# Patient Record
Sex: FEMALE | Race: WHITE | NOT HISPANIC OR LATINO | Employment: PART TIME | URBAN - METROPOLITAN AREA
[De-identification: names, ages, dates, MRNs, and addresses within clinical notes are randomized per-mention and may not be internally consistent; named-entity substitution may affect disease eponyms.]

---
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
## 2023-05-24 ENCOUNTER — TELEPHONE (OUTPATIENT)
Dept: OBSTETRICS AND GYNECOLOGY | Facility: CLINIC | Age: 18
End: 2023-05-24

## 2024-02-28 ENCOUNTER — TELEPHONE (OUTPATIENT)
Dept: INTERNAL MEDICINE | Facility: CLINIC | Age: 19
End: 2024-02-28
Payer: COMMERCIAL

## 2024-02-28 NOTE — TELEPHONE ENCOUNTER
----- Message from Josseline Joyce MA sent at 2/28/2024 11:01 AM CST -----  Contact: eric@ [phone]  Pt called                In regards to needing staff to please give a call back.

## 2024-02-29 ENCOUNTER — OFFICE VISIT (OUTPATIENT)
Dept: INTERNAL MEDICINE | Facility: CLINIC | Age: 19
End: 2024-02-29
Payer: COMMERCIAL

## 2024-02-29 VITALS
RESPIRATION RATE: 18 BRPM | HEART RATE: 93 BPM | OXYGEN SATURATION: 97 % | BODY MASS INDEX: 26.37 KG/M2 | DIASTOLIC BLOOD PRESSURE: 70 MMHG | HEIGHT: 62 IN | SYSTOLIC BLOOD PRESSURE: 108 MMHG | TEMPERATURE: 98 F | WEIGHT: 143.31 LBS

## 2024-02-29 DIAGNOSIS — M54.50 CHRONIC BILATERAL LOW BACK PAIN WITHOUT SCIATICA: ICD-10-CM

## 2024-02-29 DIAGNOSIS — Z30.431 IUD CHECK UP: ICD-10-CM

## 2024-02-29 DIAGNOSIS — F31.9 BIPOLAR 1 DISORDER: Primary | ICD-10-CM

## 2024-02-29 DIAGNOSIS — F32.9 MAJOR DEPRESSIVE DISORDER WITH CURRENT ACTIVE EPISODE, UNSPECIFIED DEPRESSION EPISODE SEVERITY, UNSPECIFIED WHETHER RECURRENT: ICD-10-CM

## 2024-02-29 DIAGNOSIS — G89.29 CHRONIC BILATERAL LOW BACK PAIN WITHOUT SCIATICA: ICD-10-CM

## 2024-02-29 DIAGNOSIS — F41.1 GAD (GENERALIZED ANXIETY DISORDER): ICD-10-CM

## 2024-02-29 DIAGNOSIS — G43.909 MIGRAINE WITHOUT STATUS MIGRAINOSUS, NOT INTRACTABLE, UNSPECIFIED MIGRAINE TYPE: ICD-10-CM

## 2024-02-29 PROBLEM — F11.20 OPIOID DEPENDENCE: Status: ACTIVE | Noted: 2022-05-04

## 2024-02-29 PROBLEM — F41.9 ANXIETY: Status: ACTIVE | Noted: 2022-05-04

## 2024-02-29 PROCEDURE — 3008F BODY MASS INDEX DOCD: CPT | Mod: CPTII,S$GLB,, | Performed by: FAMILY MEDICINE

## 2024-02-29 PROCEDURE — 3078F DIAST BP <80 MM HG: CPT | Mod: CPTII,S$GLB,, | Performed by: FAMILY MEDICINE

## 2024-02-29 PROCEDURE — 99214 OFFICE O/P EST MOD 30 MIN: CPT | Mod: S$GLB,,, | Performed by: FAMILY MEDICINE

## 2024-02-29 PROCEDURE — 99999 PR PBB SHADOW E&M-EST. PATIENT-LVL V: CPT | Mod: PBBFAC,,, | Performed by: FAMILY MEDICINE

## 2024-02-29 PROCEDURE — 1160F RVW MEDS BY RX/DR IN RCRD: CPT | Mod: CPTII,S$GLB,, | Performed by: FAMILY MEDICINE

## 2024-02-29 PROCEDURE — 3074F SYST BP LT 130 MM HG: CPT | Mod: CPTII,S$GLB,, | Performed by: FAMILY MEDICINE

## 2024-02-29 PROCEDURE — 1159F MED LIST DOCD IN RCRD: CPT | Mod: CPTII,S$GLB,, | Performed by: FAMILY MEDICINE

## 2024-02-29 RX ORDER — DOXEPIN HYDROCHLORIDE 50 MG/1
50 CAPSULE ORAL NIGHTLY PRN
COMMUNITY
Start: 2024-02-25

## 2024-02-29 RX ORDER — VENLAFAXINE HYDROCHLORIDE 37.5 MG/1
37.5 CAPSULE, EXTENDED RELEASE ORAL EVERY MORNING
COMMUNITY
Start: 2024-02-25

## 2024-02-29 RX ORDER — ARIPIPRAZOLE 2 MG/1
2 TABLET ORAL EVERY MORNING
COMMUNITY
Start: 2024-02-25

## 2024-02-29 RX ORDER — SUMATRIPTAN SUCCINATE 25 MG/1
TABLET ORAL
Qty: 9 TABLET | Refills: 2 | Status: SHIPPED | OUTPATIENT
Start: 2024-02-29

## 2024-02-29 NOTE — PROGRESS NOTES
Subjective:       Patient ID: Piedad Tompkins is a 18 y.o. female.    Chief Complaint: Establish Care    History of Present Illness    Patient presents to clinic today to establish care.    - Need for a referral for psychiatric care following recent discharge from outpatient treatment at [facility].  - Patient reports lower back and hip pain for approximately two years, which she attributes to her high school ball playing. Over-the-counter medications have not provided relief. She also reports an increase in the frequency of her migraines over the past year, occurring at least twice weekly and previously used Toradol for them. The patient is concerned about her IUD nearing its expiration date, recalling the insertion process as being extremely painful, and is in need of a referral to an OB/GYN for this issue as well as a referral to a pain management specialist for her back pain.    MEDICATIONS:  - Aleve as needed for migraines  - Abilify, Doxepin and Venlafaxine per Psychiatry at [facility], currently has Rx    MEDICAL HISTORY:  - The patient has not received a flu vaccine this season and declines to get one.  - History of psychiatric issues  - History of migraines, first diagnosed at age 8 and confirmed six months ago in [place]  - History of opioid use disorder  - Has not consulted a pain management specialist for back pain  - Recently diagnosed with bipolar I, major depressive disorder (MDD), and generalized anxiety disorder    Review of Systems   Constitutional:  Negative for chills, fatigue, fever and unexpected weight change.   HENT:  Negative for congestion, dental problem, ear pain, hearing loss, rhinorrhea and trouble swallowing.    Eyes:  Positive for pain (chronic). Negative for visual disturbance.   Respiratory:  Negative for cough and shortness of breath.    Cardiovascular:  Negative for chest pain, palpitations and leg swelling.   Gastrointestinal:  Negative for abdominal distention, abdominal pain,  blood in stool, constipation, diarrhea, nausea and vomiting.   Genitourinary:  Negative for difficulty urinating and vaginal discharge.   Musculoskeletal:  Positive for arthralgias (chronic), back pain (chronic) and myalgias (chronic).   Skin:  Negative for rash.   Neurological:  Positive for headaches (migraines, chronic, more frequent latetly). Negative for dizziness, weakness and numbness.   Hematological:  Negative for adenopathy. Does not bruise/bleed easily.   Psychiatric/Behavioral:  Positive for sleep disturbance (chronic). Negative for dysphoric mood. The patient is nervous/anxious (chronic).        Objective:     Physical Exam  Vitals reviewed.   Constitutional:       General: She is not in acute distress.     Appearance: She is well-developed.   HENT:      Head: Normocephalic and atraumatic.   Eyes:      General: Lids are normal. No scleral icterus.     Extraocular Movements: Extraocular movements intact.      Conjunctiva/sclera: Conjunctivae normal.      Pupils: Pupils are equal, round, and reactive to light.   Pulmonary:      Effort: Pulmonary effort is normal.   Neurological:      Mental Status: She is alert and oriented to person, place, and time.      Cranial Nerves: No cranial nerve deficit.      Gait: Gait normal.   Psychiatric:         Mood and Affect: Mood and affect normal.         Assessment:       1. Bipolar 1 disorder    2. MAHI (generalized anxiety disorder)    3. Major depressive disorder with current active episode, unspecified depression episode severity, unspecified whether recurrent    4. Migraine without status migrainosus, not intractable, unspecified migraine type    5. Chronic bilateral low back pain without sciatica    6. IUD check up        Plan:   1. Bipolar 1 disorder  -     Ambulatory referral/consult to Psychiatry    2. MAHI (generalized anxiety disorder)  -     Ambulatory referral/consult to Psychiatry    3. Major depressive disorder with current active episode, unspecified  depression episode severity, unspecified whether recurrent  -     Ambulatory referral/consult to Psychiatry    4. Migraine without status migrainosus, not intractable, unspecified migraine type  -     sumatriptan (IMITREX) 25 MG Tab; 1 tablet po at onset of headache, may repeat dose in 2 hours if headache is still present.  Dispense: 9 tablet; Refill: 2    5. Chronic bilateral low back pain without sciatica  -     Ambulatory referral/consult to Pain Clinic; Future; Expected date: 03/07/2024    6. IUD check up  -     Ambulatory referral/consult to Obstetrics / Gynecology; Future; Expected date: 03/07/2024       Addressed the patient's immediate concerns, which encompassed mental health issues, IUD replacement, back pain, and migraines. Referred the patient to a psychiatrist, an OB/GYN, and a pain management team, taking into account the patient's recent hospitalization and psychiatric history. Advised to return for a checkup in 6 months and to communicate if they require medications prior to their psychiatry appointment.   Prescribed Imitrex for the patient's migraines. Explained the potential triggers for migraines, emphasizing the role of stress and anxiety in their frequency.   Advised can provide refills of psychiatric medications until her psychiatry appointment, but will not be able to fill medications going forward.    Patient expressed understanding and agreement with plan.    Health Maintenance reviewed/updated.    Follow up in about 6 months (around 8/29/2024), or if symptoms worsen or fail to improve, for EPP/annual with [facility].    This note was generated with the assistance of ambient listening technology. Verbal consent was obtained by the patient and accompanying visitor(s) for the recording of patient appointment to facilitate this note. I attest to having reviewed and edited the generated note for accuracy, though some syntax or spelling errors may persist. Please contact the author of this note for  any clarification.

## 2024-03-12 ENCOUNTER — TELEPHONE (OUTPATIENT)
Dept: OBSTETRICS AND GYNECOLOGY | Facility: CLINIC | Age: 19
End: 2024-03-12
Payer: COMMERCIAL

## 2024-03-12 NOTE — TELEPHONE ENCOUNTER
Lvm for pt to return call to get appointment scheduled with our department.   Referral placed for IUD check

## 2024-03-15 ENCOUNTER — TELEPHONE (OUTPATIENT)
Dept: PAIN MEDICINE | Facility: CLINIC | Age: 19
End: 2024-03-15
Payer: COMMERCIAL

## 2024-03-18 ENCOUNTER — TELEPHONE (OUTPATIENT)
Dept: PAIN MEDICINE | Facility: CLINIC | Age: 19
End: 2024-03-18
Payer: COMMERCIAL

## 2024-07-05 ENCOUNTER — TELEPHONE (OUTPATIENT)
Dept: INTERNAL MEDICINE | Facility: CLINIC | Age: 19
End: 2024-07-05
Payer: COMMERCIAL

## 2024-07-08 ENCOUNTER — PATIENT MESSAGE (OUTPATIENT)
Dept: PSYCHIATRY | Facility: CLINIC | Age: 19
End: 2024-07-08
Payer: COMMERCIAL

## 2024-07-08 ENCOUNTER — TELEPHONE (OUTPATIENT)
Dept: PSYCHIATRY | Facility: CLINIC | Age: 19
End: 2024-07-08
Payer: COMMERCIAL

## 2024-08-01 ENCOUNTER — TELEPHONE (OUTPATIENT)
Dept: INTERNAL MEDICINE | Facility: CLINIC | Age: 19
End: 2024-08-01
Payer: COMMERCIAL

## 2024-08-31 ENCOUNTER — ON-DEMAND VIRTUAL (OUTPATIENT)
Dept: URGENT CARE | Facility: CLINIC | Age: 19
End: 2024-08-31
Payer: COMMERCIAL

## 2024-08-31 DIAGNOSIS — B35.4 TINEA CORPORIS: ICD-10-CM

## 2024-08-31 DIAGNOSIS — R35.0 URINARY FREQUENCY: Primary | ICD-10-CM

## 2024-08-31 PROCEDURE — 99213 OFFICE O/P EST LOW 20 MIN: CPT | Mod: CC,95,, | Performed by: FAMILY MEDICINE

## 2024-08-31 RX ORDER — NITROFURANTOIN 25; 75 MG/1; MG/1
100 CAPSULE ORAL 2 TIMES DAILY
Qty: 10 CAPSULE | Refills: 0 | Status: SHIPPED | OUTPATIENT
Start: 2024-08-31 | End: 2024-09-05

## 2024-08-31 RX ORDER — CLOTRIMAZOLE 1 %
CREAM (GRAM) TOPICAL 2 TIMES DAILY
Qty: 85 G | Refills: 0 | Status: SHIPPED | OUTPATIENT
Start: 2024-08-31

## 2024-08-31 NOTE — PROGRESS NOTES
Subjective:      Patient ID: Piedad Tompkins is a 19 y.o. female.    Vitals:  vitals were not taken for this visit.     Chief Complaint: Dysuria (1 month , )      Visit Type: TELE AUDIOVISUAL home [place]    Present with the patient at the time of consultation: TELEMED PRESENT WITH PATIENT: None    Past Medical History:   Diagnosis Date    Anxiety     Depression      Past Surgical History:   Procedure Laterality Date    TONSILLECTOMY, ADENOIDECTOMY       Review of patient's allergies indicates:  No Known Allergies  Current Outpatient Medications on File Prior to Visit   Medication Sig Dispense Refill    ARIPiprazole (ABILIFY) 2 MG Tab Take 2 mg by mouth every morning.      doxepin (SINEQUAN) 50 MG capsule Take 50 mg by mouth nightly as needed (insomnia).      sumatriptan (IMITREX) 25 MG Tab 1 tablet po at onset of headache, may repeat dose in 2 hours if headache is still present. 9 tablet 2    valACYclovir (VALTREX) 500 MG tablet Take 2 tablets (1,000 mg total) by mouth once daily. (Patient not taking: Reported on 2/29/2024) 60 tablet 11    venlafaxine (EFFEXOR-XR) 37.5 MG 24 hr capsule Take 37.5 mg by mouth every morning.       Current Facility-Administered Medications on File Prior to Visit   Medication Dose Route Frequency Provider Last Rate Last Admin    levonorgestrel 17.5 mcg/24 hrs (5 yrs) 19.5 mg IUD 17.5 mcg  17.5 mcg Intrauterine  Chiquis Gauthier MD   17.5 mcg at 07/23/19 1312     Family History   Problem Relation Name Age of Onset    No Known Problems Paternal Grandfather      No Known Problems Paternal Grandmother      No Known Problems Maternal Grandmother      No Known Problems Maternal Grandfather      No Known Problems Father      No Known Problems Mother      No Known Problems Brother      No Known Problems Sister      Breast cancer Neg Hx      Colon cancer Neg Hx      Ovarian cancer Neg Hx         Medications Ordered                [id number] [place] - [address], [place] [address]    Telephone: [phone]   Fax: [phone]   Hours: Not open 24 hours                         E-Prescribed (2 of 2)              clotrimazole (LOTRIMIN) 1 % cream    Sig: Apply topically 2 (two) times daily.       Start: 8/31/24     Quantity: 85 g Refills: 0                         nitrofurantoin, macrocrystal-monohydrate, (MACROBID) 100 MG capsule    Sig: Take 1 capsule (100 mg total) by mouth 2 (two) times daily. for 5 days       Start: 8/31/24     Quantity: 10 capsule Refills: 0                           Ohs Peq Odvv Intake    8/31/2024  2:09 PM CDT - Filed by Patient   What is your current physical address in the event of a medical emergency? [address] [place] [address]   Are you able to take your vital signs? No   Please attach any relevant images or files          ringworm and UTI   on IUD       Patient has mild  dysuria ,frequency feeling no urgency  on and off of 1 months, no UTI in past 3 months no history of a kidney stone or kidney infection, no vaginal discharge he, not a concern about STD, also she got a ringworm on her arm due to new kitten at home        Constitution: Negative for fever.   Gastrointestinal:  Negative for abdominal pain, nausea and vomiting.   Genitourinary:  Negative for flank pain, hematuria and vaginal discharge.        Objective:   The physical exam was conducted virtually.  Physical Exam   Constitutional: She is oriented to person, place, and time.   HENT:   Head: Normocephalic and atraumatic.   Eyes: Conjunctivae are normal.   Pulmonary/Chest: Effort normal. No respiratory distress.   Abdominal: Normal appearance.   Musculoskeletal:        Arms:       Comments: Ring shape rash on arms    Neurological: no focal deficit. She is alert and oriented to person, place, and time.   Skin: Skin is rash.   Psychiatric: Mood, judgment and thought content normal.       Assessment:     1. Urinary frequency    2. Tinea corporis        Plan:        Urinary frequency    Tinea corporis    Other orders  -     nitrofurantoin, macrocrystal-monohydrate, (MACROBID) 100 MG capsule; Take 1 capsule (100 mg total) by mouth 2 (two) times daily. for 5 days  Dispense: 10 capsule; Refill: 0  -     clotrimazole (LOTRIMIN) 1 % cream; Apply topically 2 (two) times daily.  Dispense: 85 g; Refill: 0        Suspected UTI by symptoms, Macrobid prescribed, drink a lot of fluid, if no improving in 3 to 5 days, please follow-up with provider in person.

## 2024-08-31 NOTE — PATIENT INSTRUCTIONS
Suspected UTI by symptoms, Macrobid prescribed, drink a lot of fluid, if no improving in 3 to 5 days, please follow-up with provider in person.

## 2024-09-16 ENCOUNTER — TELEPHONE (OUTPATIENT)
Dept: OBSTETRICS AND GYNECOLOGY | Facility: CLINIC | Age: 19
End: 2024-09-16
Payer: COMMERCIAL

## 2024-09-16 NOTE — TELEPHONE ENCOUNTER
----- Message from Valerie Nielsen sent at 2024 12:27 PM CDT -----  Contact: Piedad Morales is calling to receive a call back at [phone]. Reports needing to schedule as a np for iud removal. Reports its  and irritating her area.

## 2024-09-16 NOTE — TELEPHONE ENCOUNTER
Left message for patient to return call to [phone].    Patient needs appt. For IUD removal.  LOV 1/3/22

## 2024-10-11 ENCOUNTER — OFFICE VISIT (OUTPATIENT)
Dept: OBSTETRICS AND GYNECOLOGY | Facility: CLINIC | Age: 19
End: 2024-10-11
Payer: COMMERCIAL

## 2024-10-11 VITALS
HEIGHT: 62 IN | DIASTOLIC BLOOD PRESSURE: 60 MMHG | WEIGHT: 150.13 LBS | BODY MASS INDEX: 27.63 KG/M2 | SYSTOLIC BLOOD PRESSURE: 110 MMHG

## 2024-10-11 DIAGNOSIS — Z12.4 PAPANICOLAOU SMEAR FOR CERVICAL CANCER SCREENING: Primary | ICD-10-CM

## 2024-10-11 DIAGNOSIS — B36.9 FUNGAL INFECTION OF SKIN: ICD-10-CM

## 2024-10-11 DIAGNOSIS — Z11.3 SCREEN FOR STD (SEXUALLY TRANSMITTED DISEASE): ICD-10-CM

## 2024-10-11 DIAGNOSIS — Z30.432 ENCOUNTER FOR IUD REMOVAL: ICD-10-CM

## 2024-10-11 PROCEDURE — 87591 N.GONORRHOEAE DNA AMP PROB: CPT | Performed by: NURSE PRACTITIONER

## 2024-10-11 PROCEDURE — 99999 PR PBB SHADOW E&M-EST. PATIENT-LVL III: CPT | Mod: PBBFAC,,, | Performed by: NURSE PRACTITIONER

## 2024-10-11 PROCEDURE — 87491 CHLMYD TRACH DNA AMP PROBE: CPT | Performed by: NURSE PRACTITIONER

## 2024-10-11 RX ORDER — FLUCONAZOLE 150 MG/1
TABLET ORAL
Qty: 3 TABLET | Refills: 0 | Status: SHIPPED | OUTPATIENT
Start: 2024-10-11

## 2024-10-11 NOTE — PROCEDURES
Removal of IUD    Date/Time: 10/11/2024 1:30 PM    Performed by: Luisa Farrar NP  Authorized by: Luisa Farrar NP    Consent obtained:  Prior to procedure the appropriate consent was completed and verified  Consent given by:  Patient  Procedure risks and benefits discussed: yes    Patient questions answered: yes    Patient agrees, verbalizes understanding, and wants to proceed: yes    Educational handouts given: no    Instructions and paperwork completed: yes    Implant grasped by: ring forceps  Removal due to infection and inflammatory reaction: no    Other reason for removal:  Seeking pregnancy  Removal due to mechanical complications: no    Removed with no complications: yes  no

## 2024-10-11 NOTE — PROGRESS NOTES
"  Subjective:       Patient ID: Piedad Tompkins is a 19 y.o. female.    Chief Complaint:  Well Woman      History of Present Illness  HPI  Desires iud removal re:  seeking pregnancy  Reports ring worm to bilateral thigh, desires antifungal medication for the rash until she is able to see dermatologist   Health Maintenance   Topic Date Due    Lipid Panel  Never done    Pap Smear  Never done    TETANUS VACCINE  2024    Chlamydia Screening  2024    Hepatitis C Screening  Completed    HPV Vaccines  Completed     GYN & OB History  No LMP recorded. Patient has had an implant.   Date of Last Pap: today     OB History    Para Term  AB Living   0 0 0 0 0 0   SAB IAB Ectopic Multiple Live Births   0 0 0 0         Review of Systems  Review of Systems        Objective:   /60   Ht 5' 2" (1.575 m)   Wt 68.1 kg (150 lb 2.1 oz)   BMI 27.46 kg/m²    Physical Exam:   Constitutional: She is oriented to person, place, and time. She appears well-developed and well-nourished.             Abdominal: Soft.     Genitourinary:    Inguinal canal and vagina normal.      Pelvic exam was performed with patient supine.   The external female genitalia was normal.   Genitalia hair distrobution normal .     Labial bartholins normal.Cervix is normal. No erythema, vaginal discharge, bleeding, rectocele, cystocele or prolapse of vaginal walls in the vagina.    No foreign body in the vagina.      No signs of injury in the vagina.      pap smear completed              Neurological: She is alert and oriented to person, place, and time.    Skin: Skin is warm and dry.    Psychiatric: She has a normal mood and affect. Her behavior is normal. Judgment and thought content normal.      Assessment:        1. Papanicolaou smear for cervical cancer screening    2. Screen for STD (sexually transmitted disease)    3. Encounter for IUD removal    4. Fungal infection of skin                Plan:            Piedad was seen today for " well woman.    Diagnoses and all orders for this visit:    Papanicolaou smear for cervical cancer screening  -     Liquid-Based Pap Smear, Screening    Screen for STD (sexually transmitted disease)  -     C. trachomatis/N. gonorrhoeae by AMP DNA [facility]; Vagina  -     HIV 1/2 Ag/Ab (4th Gen); Future  -     Treponema Pallidium Antibodies IgG, IgM; Future  -     Hepatitis Panel, Acute; Future    Encounter for IUD removal  -     Removal of IUD    Fungal infection of skin  -     fluconazole (DIFLUCAN) 150 MG Tab; Take one tablet today and may repeat every three days up to 3 doses    Return to clinic in one year for WWE

## 2024-10-12 LAB
C TRACH DNA SPEC QL NAA+PROBE: NOT DETECTED
N GONORRHOEA DNA SPEC QL NAA+PROBE: NOT DETECTED

## 2025-01-04 ENCOUNTER — ON-DEMAND VIRTUAL (OUTPATIENT)
Dept: URGENT CARE | Facility: CLINIC | Age: 20
End: 2025-01-04
Payer: COMMERCIAL

## 2025-01-04 DIAGNOSIS — N39.0 URINARY TRACT INFECTION WITHOUT HEMATURIA, SITE UNSPECIFIED: Primary | ICD-10-CM

## 2025-01-04 RX ORDER — NITROFURANTOIN 25; 75 MG/1; MG/1
100 CAPSULE ORAL 2 TIMES DAILY
Qty: 14 CAPSULE | Refills: 0 | Status: SHIPPED | OUTPATIENT
Start: 2025-01-04 | End: 2025-01-11

## 2025-01-04 NOTE — LETTER
January 4, 2025    Piedad Tompkins  [address] [place] [address]             Virtual Visit - Urgent Care  Urgent Care  [address] [address]   January 4, 2025     Patient: Piedad Tompkins   YOB: 2005   Date of Visit: 1/4/2025       To Whom it May Concern:    Piedad Tompkins was seen virtually on 1/4/2025. She may return to work on 1/4/25 . Please excuse from work 1/3/25 due to illness.    Please excuse her from any classes or work missed.    If you have any questions or concerns, please don't hesitate to call.    Sincerely,         Denisha Gomez, ANGYP

## 2025-01-04 NOTE — PROGRESS NOTES
Subjective:      Patient ID: Piedad Tompkins is a 19 y.o. female.    Vitals:  vitals were not taken for this visit.     Chief Complaint: Urinary Tract Infection and Dysuria      Visit Type: TELE AUDIOVISUAL    Present with the patient at the time of consultation: TELEMED PRESENT WITH PATIENT: None    Past Medical History:   Diagnosis Date    Anxiety     Depression      Past Surgical History:   Procedure Laterality Date    TONSILLECTOMY, ADENOIDECTOMY       Review of patient's allergies indicates:  No Known Allergies  Current Outpatient Medications on File Prior to Visit   Medication Sig Dispense Refill    clotrimazole (LOTRIMIN) 1 % cream Apply topically 2 (two) times daily. 85 g 0    fluconazole (DIFLUCAN) 150 MG Tab Take one tablet today and may repeat every three days up to 3 doses 3 tablet 0    valACYclovir (VALTREX) 500 MG tablet Take 2 tablets (1,000 mg total) by mouth once daily. 60 tablet 11     Current Facility-Administered Medications on File Prior to Visit   Medication Dose Route Frequency Provider Last Rate Last Admin    levonorgestrel 17.5 mcg/24 hrs (5 yrs) 19.5 mg IUD 17.5 mcg  17.5 mcg Intrauterine  Chiquis Gauthier MD   17.5 mcg at 07/23/19 1312     Family History   Problem Relation Name Age of Onset    No Known Problems Paternal Grandfather      No Known Problems Paternal Grandmother      No Known Problems Maternal Grandmother      No Known Problems Maternal Grandfather      No Known Problems Father      No Known Problems Mother      No Known Problems Brother      No Known Problems Sister      Breast cancer Neg Hx      Colon cancer Neg Hx      Ovarian cancer Neg Hx         Medications Ordered                [id number] [place] - [address] [facility]   [address] [place] [place] [address]    Telephone: [phone]   Fax: [phone]   Hours: Not open 24 hours                         E-Prescribed (1 of 1)              nitrofurantoin,  macrocrystal-monohydrate, (MACROBID) 100 MG capsule    Sig: Take 1 capsule (100 mg total) by mouth 2 (two) times daily. for 7 days       Start: 1/4/25     Quantity: 14 capsule Refills: 0                           Ohs Peq Odvv Intake    1/4/2025  1:01 PM CST - Filed by Patient   What is your current physical address in the event of a medical emergency? [address]   Are you able to take your vital signs? No   Please attach any relevant images or files    Is your employer contracted with [facility]? No         HPI     Dysuria     Additional comments: Urinary frequency and burning with urination since yesterday.  Had fever yesterday but none today.  Two patient identifiers were used-name was repeated verbally as well as date of birth.  The patient is located at her workplace in [place]          Constitution: Positive for fever (now resolved).   Genitourinary:  Positive for dysuria, frequency and urgency. Negative for hematuria.        Objective:   The physical exam was conducted virtually.  Physical Exam   Constitutional: She is oriented to person, place, and time. No distress.   HENT:   Head: Normocephalic and atraumatic.   Neck: Neck supple.   Pulmonary/Chest: Effort normal. No respiratory distress.   Abdominal: Normal appearance.   Musculoskeletal: Normal range of motion.         General: Normal range of motion.   Neurological: no focal deficit. She is alert and oriented to person, place, and time.   Skin: Skin is not pale.   Psychiatric: Her behavior is normal. Mood, judgment and thought content normal.       Assessment:     1. Urinary tract infection without hematuria, site unspecified        Plan:       Urinary tract infection without hematuria, site unspecified    Other orders  -     nitrofurantoin, macrocrystal-monohydrate, (MACROBID) 100 MG capsule; Take 1 capsule (100 mg total) by mouth 2 (two) times daily. for 7 days  Dispense: 14 capsule; Refill: 0    Increase intake of fluids;  avoid caffeine.  Meds as directed.  F/u with PCP or to ER if symptoms become worse.    You must understand that you've received a virtual Care treatment only and that you may be released before all your medical problems are known or treated. You, the patient, will arrange for follow up care as instructed.  If your condition worsens we recommend that you receive another evaluation at an urgent care in person, the emergency room or contact your primary medical clinics after hours call service to discuss your concerns.

## 2025-08-01 ENCOUNTER — TELEPHONE (OUTPATIENT)
Dept: INTERNAL MEDICINE | Facility: CLINIC | Age: 20
End: 2025-08-01

## 2025-08-01 NOTE — TELEPHONE ENCOUNTER
----- Message from Laureen Mac MD sent at 8/1/2025 10:07 AM CDT -----  Patient last seen February 2024, will need visit with available provider for evaluation/requested work excuse. Please assist with scheduling. If nothing available urgent care is an option. Thank you!

## 2025-08-11 ENCOUNTER — PATIENT MESSAGE (OUTPATIENT)
Dept: OBSTETRICS AND GYNECOLOGY | Facility: CLINIC | Age: 20
End: 2025-08-11

## 2025-08-11 ENCOUNTER — OFFICE VISIT (OUTPATIENT)
Dept: OBSTETRICS AND GYNECOLOGY | Facility: CLINIC | Age: 20
End: 2025-08-11
Payer: COMMERCIAL

## 2025-08-11 ENCOUNTER — LAB VISIT (OUTPATIENT)
Dept: LAB | Facility: HOSPITAL | Age: 20
End: 2025-08-11
Attending: ADVANCED PRACTICE MIDWIFE
Payer: COMMERCIAL

## 2025-08-11 VITALS — SYSTOLIC BLOOD PRESSURE: 102 MMHG | WEIGHT: 148.13 LBS | BODY MASS INDEX: 27.1 KG/M2 | DIASTOLIC BLOOD PRESSURE: 62 MMHG

## 2025-08-11 DIAGNOSIS — F41.9 ANXIETY: ICD-10-CM

## 2025-08-11 DIAGNOSIS — O98.511 HERPES SIMPLEX VIRUS TYPE 2 (HSV-2) INFECTION AFFECTING PREGNANCY IN FIRST TRIMESTER: ICD-10-CM

## 2025-08-11 DIAGNOSIS — B00.9 HERPES SIMPLEX VIRUS TYPE 2 (HSV-2) INFECTION AFFECTING PREGNANCY IN FIRST TRIMESTER: ICD-10-CM

## 2025-08-11 DIAGNOSIS — Z32.01 POSITIVE PREGNANCY TEST: ICD-10-CM

## 2025-08-11 DIAGNOSIS — A60.04 TYPE 2 HSV INFECTION OF VULVOVAGINAL REGION: ICD-10-CM

## 2025-08-11 DIAGNOSIS — F31.9 BIPOLAR 1 DISORDER: ICD-10-CM

## 2025-08-11 DIAGNOSIS — O36.80X0 ENCOUNTER TO DETERMINE FETAL VIABILITY OF PREGNANCY, SINGLE OR UNSPECIFIED FETUS: Primary | ICD-10-CM

## 2025-08-11 PROBLEM — O98.519 HSV-2 INFECTION COMPLICATING PREGNANCY: Status: ACTIVE | Noted: 2025-08-11

## 2025-08-11 LAB
ABSOLUTE EOSINOPHIL (OHS): 0.05 K/UL
ABSOLUTE MONOCYTE (OHS): 0.4 K/UL (ref 0.3–1)
ABSOLUTE NEUTROPHIL COUNT (OHS): 2.01 K/UL (ref 1.8–7.7)
BASOPHILS # BLD AUTO: 0.07 K/UL
BASOPHILS NFR BLD AUTO: 1.5 %
ERYTHROCYTE [DISTWIDTH] IN BLOOD BY AUTOMATED COUNT: 12.6 % (ref 11.5–14.5)
HAV IGM SERPL QL IA: NORMAL
HBV CORE IGM SERPL QL IA: NORMAL
HBV SURFACE AG SERPL QL IA: NORMAL
HCT VFR BLD AUTO: 36.3 % (ref 37–48.5)
HCV AB SERPL QL IA: NORMAL
HGB BLD-MCNC: 12.3 GM/DL (ref 12–16)
HIV 1+2 AB+HIV1 P24 AG SERPL QL IA: NORMAL
IMM GRANULOCYTES # BLD AUTO: 0.05 K/UL (ref 0–0.04)
IMM GRANULOCYTES NFR BLD AUTO: 1.1 % (ref 0–0.5)
INDIRECT COOMBS: NORMAL
LYMPHOCYTES # BLD AUTO: 2.17 K/UL (ref 1–4.8)
MCH RBC QN AUTO: 27.4 PG (ref 27–31)
MCHC RBC AUTO-ENTMCNC: 33.9 G/DL (ref 32–36)
MCV RBC AUTO: 81 FL (ref 82–98)
NUCLEATED RBC (/100WBC) (OHS): 0 /100 WBC
PLATELET # BLD AUTO: 214 K/UL (ref 150–450)
PMV BLD AUTO: 10.1 FL (ref 9.2–12.9)
RBC # BLD AUTO: 4.49 M/UL (ref 4–5.4)
RELATIVE EOSINOPHIL (OHS): 1.1 %
RELATIVE LYMPHOCYTE (OHS): 45.7 % (ref 18–48)
RELATIVE MONOCYTE (OHS): 8.4 % (ref 4–15)
RELATIVE NEUTROPHIL (OHS): 42.2 % (ref 38–73)
RH BLD: NORMAL
SPECIMEN OUTDATE: NORMAL
T PALLIDUM IGG+IGM SER QL: NORMAL
WBC # BLD AUTO: 4.75 K/UL (ref 3.9–12.7)

## 2025-08-11 PROCEDURE — 83020 HEMOGLOBIN ELECTROPHORESIS: CPT

## 2025-08-11 PROCEDURE — 36415 COLL VENOUS BLD VENIPUNCTURE: CPT

## 2025-08-11 PROCEDURE — 86762 RUBELLA ANTIBODY: CPT

## 2025-08-11 PROCEDURE — 87591 N.GONORRHOEAE DNA AMP PROB: CPT | Performed by: ADVANCED PRACTICE MIDWIFE

## 2025-08-11 PROCEDURE — 86850 RBC ANTIBODY SCREEN: CPT | Performed by: ADVANCED PRACTICE MIDWIFE

## 2025-08-11 PROCEDURE — 99999 PR PBB SHADOW E&M-EST. PATIENT-LVL III: CPT | Mod: PBBFAC,,, | Performed by: ADVANCED PRACTICE MIDWIFE

## 2025-08-11 PROCEDURE — 1159F MED LIST DOCD IN RCRD: CPT | Mod: CPTII,S$GLB,, | Performed by: ADVANCED PRACTICE MIDWIFE

## 2025-08-11 PROCEDURE — 87086 URINE CULTURE/COLONY COUNT: CPT | Performed by: ADVANCED PRACTICE MIDWIFE

## 2025-08-11 PROCEDURE — 3074F SYST BP LT 130 MM HG: CPT | Mod: CPTII,S$GLB,, | Performed by: ADVANCED PRACTICE MIDWIFE

## 2025-08-11 PROCEDURE — 3008F BODY MASS INDEX DOCD: CPT | Mod: CPTII,S$GLB,, | Performed by: ADVANCED PRACTICE MIDWIFE

## 2025-08-11 PROCEDURE — 87389 HIV-1 AG W/HIV-1&-2 AB AG IA: CPT

## 2025-08-11 PROCEDURE — 99214 OFFICE O/P EST MOD 30 MIN: CPT | Mod: S$GLB,,, | Performed by: ADVANCED PRACTICE MIDWIFE

## 2025-08-11 PROCEDURE — 80074 ACUTE HEPATITIS PANEL: CPT

## 2025-08-11 PROCEDURE — 86593 SYPHILIS TEST NON-TREP QUANT: CPT

## 2025-08-11 PROCEDURE — 3078F DIAST BP <80 MM HG: CPT | Mod: CPTII,S$GLB,, | Performed by: ADVANCED PRACTICE MIDWIFE

## 2025-08-11 PROCEDURE — 85025 COMPLETE CBC W/AUTO DIFF WBC: CPT

## 2025-08-11 RX ORDER — DESVENLAFAXINE 50 MG/1
50 TABLET, FILM COATED, EXTENDED RELEASE ORAL DAILY
COMMUNITY

## 2025-08-11 RX ORDER — QUETIAPINE FUMARATE 200 MG/1
200 TABLET, FILM COATED ORAL NIGHTLY PRN
COMMUNITY
Start: 2025-08-07

## 2025-08-12 LAB
HGB A2 MFR BLD HPLC: 2.5 % (ref 2.2–3.2)
HGB FRACT BLD ELPH-IMP: NORMAL
PATHOLOGIST INTERPRETATION - HGB SERUM (OHS): NORMAL
RUBV IGG SER-ACNC: 37.5 IU/ML
RUBV IGG SER-IMP: REACTIVE

## 2025-08-13 LAB
BACTERIA UR CULT: NO GROWTH
C TRACH DNA SPEC QL NAA+PROBE: NOT DETECTED
CTGC SOURCE (OHS) ORD-325: NORMAL
N GONORRHOEA DNA UR QL NAA+PROBE: NOT DETECTED

## 2025-08-27 ENCOUNTER — TELEPHONE (OUTPATIENT)
Dept: OBSTETRICS AND GYNECOLOGY | Facility: CLINIC | Age: 20
End: 2025-08-27
Payer: COMMERCIAL

## 2025-08-27 DIAGNOSIS — O21.9 PREGNANCY RELATED NAUSEA AND VOMITING, ANTEPARTUM: Primary | ICD-10-CM

## 2025-08-27 RX ORDER — ONDANSETRON 4 MG/1
4 TABLET, ORALLY DISINTEGRATING ORAL EVERY 6 HOURS PRN
Qty: 25 TABLET | Refills: 1 | Status: SHIPPED | OUTPATIENT
Start: 2025-08-27

## 2025-09-04 ENCOUNTER — OFFICE VISIT (OUTPATIENT)
Dept: OBSTETRICS AND GYNECOLOGY | Facility: CLINIC | Age: 20
End: 2025-09-04
Payer: COMMERCIAL

## 2025-09-04 ENCOUNTER — PROCEDURE VISIT (OUTPATIENT)
Dept: OBSTETRICS AND GYNECOLOGY | Facility: CLINIC | Age: 20
End: 2025-09-04
Payer: COMMERCIAL

## 2025-09-04 VITALS
DIASTOLIC BLOOD PRESSURE: 64 MMHG | BODY MASS INDEX: 27.79 KG/M2 | WEIGHT: 151 LBS | SYSTOLIC BLOOD PRESSURE: 110 MMHG | HEIGHT: 62 IN

## 2025-09-04 DIAGNOSIS — O03.4 INCOMPLETE ABORTION: Primary | ICD-10-CM

## 2025-09-04 DIAGNOSIS — O36.80X0 ENCOUNTER TO DETERMINE FETAL VIABILITY OF PREGNANCY, SINGLE OR UNSPECIFIED FETUS: ICD-10-CM

## 2025-09-04 DIAGNOSIS — Z30.09 ENCOUNTER FOR COUNSELING REGARDING CONTRACEPTION: ICD-10-CM

## 2025-09-04 PROCEDURE — 3074F SYST BP LT 130 MM HG: CPT | Mod: CPTII,S$GLB,,

## 2025-09-04 PROCEDURE — 3008F BODY MASS INDEX DOCD: CPT | Mod: CPTII,S$GLB,,

## 2025-09-04 PROCEDURE — 1159F MED LIST DOCD IN RCRD: CPT | Mod: CPTII,S$GLB,,

## 2025-09-04 PROCEDURE — 99214 OFFICE O/P EST MOD 30 MIN: CPT | Mod: S$GLB,,,

## 2025-09-04 PROCEDURE — 3078F DIAST BP <80 MM HG: CPT | Mod: CPTII,S$GLB,,

## 2025-09-04 PROCEDURE — 99999 PR PBB SHADOW E&M-EST. PATIENT-LVL III: CPT | Mod: PBBFAC,,,
